# Patient Record
(demographics unavailable — no encounter records)

---

## 2023-03-20 NOTE — XRAY REPORT
PROCEDURE:  Chest 2 View X-Ray

 

INDICATIONS:  TACHYPNEA

 

TECHNIQUE:  2 views of the chest were acquired.  

 

COMPARISON:  None.

 

FINDINGS:  

 

Surgical changes and devices:  None.  

 

Lungs and pleura: Streaky perihilar opacities and peribronchial cuffing.

 

Mediastinum:  Mediastinal contours are normal.  Heart size is normal.  

 

Bones and chest wall:  No suspicious bony abnormalities.  Soft tissues appear unremarkable.  

 

IMPRESSION:  

Streaky perihilar opacities and peribronchial cuffing, suggestive of viral pneumonia.

 

Reviewed by: Vinay Cedeno on 3/20/2023 12:38 PM PDT

Approved by: Vinay Cedeno on 3/20/2023 12:38 PM PDT

 

 

Station ID:  [id number]